# Patient Record
Sex: FEMALE | Race: WHITE | NOT HISPANIC OR LATINO | Employment: UNEMPLOYED | URBAN - METROPOLITAN AREA
[De-identification: names, ages, dates, MRNs, and addresses within clinical notes are randomized per-mention and may not be internally consistent; named-entity substitution may affect disease eponyms.]

---

## 2017-04-28 ENCOUNTER — ALLSCRIPTS OFFICE VISIT (OUTPATIENT)
Dept: OTHER | Facility: OTHER | Age: 47
End: 2017-04-28

## 2017-04-28 DIAGNOSIS — Z12.31 ENCOUNTER FOR SCREENING MAMMOGRAM FOR MALIGNANT NEOPLASM OF BREAST: ICD-10-CM

## 2018-01-10 NOTE — RESULT NOTES
Verified Results  ([address] [facility]) Pap IG, HPV-hr [id number] Denae Butt     Test Name Result Flag Reference   DIAGNOSIS: Comment     NEGATIVE FOR INTRAEPITHELIAL LESION AND MALIGNANCY  THE CYTOLOGY PROCESSING WAS PERFORMED AT THE [facility] LOCATED AT  [facility], [address] [place] [address], [address] [place] [id number]  Specimen adequacy: Comment     Satisfactory for evaluation  Endocervical and/or squamous metaplastic  cells (endocervical component) are present  Clinician provided ICD10: Comment     [id number]   Performed by: Torin Mcnair, Cytotechnologist (ASCP)     Erin Hanson Note: Comment     The Pap smear is a screening test designed to aid in the detection of  premalignant and malignant conditions of the uterine cervix  It is not a  diagnostic procedure and should not be used as the sole means of detecting  cervical cancer  Both false-positive and false-negative reports do occur  HPV, high-risk Negative  Negative   This high-risk HPV test detects thirteen high-risk types  (16/18/31/33/35/39/45/51/52/56/58/59/68) without differentiation

## 2018-01-14 VITALS
HEIGHT: 63 IN | DIASTOLIC BLOOD PRESSURE: 80 MMHG | SYSTOLIC BLOOD PRESSURE: 118 MMHG | HEART RATE: 68 BPM | RESPIRATION RATE: 16 BRPM | WEIGHT: 167.38 LBS | BODY MASS INDEX: 29.66 KG/M2

## 2018-08-10 ENCOUNTER — ANNUAL EXAM (OUTPATIENT)
Dept: OBGYN CLINIC | Facility: CLINIC | Age: 48
End: 2018-08-10
Payer: COMMERCIAL

## 2018-08-10 VITALS — WEIGHT: 165.8 LBS | SYSTOLIC BLOOD PRESSURE: 110 MMHG | BODY MASS INDEX: 29.84 KG/M2 | DIASTOLIC BLOOD PRESSURE: 74 MMHG

## 2018-08-10 DIAGNOSIS — Z12.39 SCREENING FOR MALIGNANT NEOPLASM OF BREAST: ICD-10-CM

## 2018-08-10 DIAGNOSIS — Z01.419 WELL WOMAN EXAM WITH ROUTINE GYNECOLOGICAL EXAM: Primary | ICD-10-CM

## 2018-08-10 PROCEDURE — 99396 PREV VISIT EST AGE 40-64: CPT | Performed by: OBSTETRICS & GYNECOLOGY

## 2018-08-10 NOTE — PROGRESS NOTES
ASSESSMENT & PLAN: Mil Doty is a 53 y o  [id number] with normal gynecologic exam     1   Routine well woman exam done today  2  Pap and HPV:  The patient's last pap was  and was normal   Pap and cotesting was not done today  Current ASCCP Guidelines reviewed  Repeat pap smear on 2019  3  Mammogram ordered  4  The following were reviewed in today's visit: breast self exam, mammography screening ordered, adequate intake of calcium and vitamin D, exercise and healthy diet  5  Contraception: IUD in place   6  Pelvic pain- pain is with intense work out  Has TVT-O placed by Dr Aubrie Cai office  Has not followed up with them  No apparent defects or visible pathology    Moving to [employer] for 3-5 years    CC:  Annual Gynecologic Examination    HPI: Mil Doty is a 53 y o  [id number] who presents for annual gynecologic examination  She has the following concerns:  Pelvic pain during workouts    Health Maintenance:    She exercises 4 days per week with walking  She wears her seatbelt routinely  She does perform irregular monthly self breast exams  She feels safe at home  Patients does follow a ketogenic diet  Her last pap was   Last mammogram: 2016    Past Medical History:   Diagnosis Date    Skin abnormalities        Past Surgical History:   Procedure Laterality Date    BLADDER SUSPENSION      LASIK         Past OB/Gyn History:  OB History      Para Term  AB Living    3 2 2   1 2    SAB TAB Ectopic Multiple Live Births    1       2           No LMP recorded (exact date)  Patient is not currently having periods (Reason: Birth Control)  Period Cycle (Days):  (HAS MIRENA)   History of sexually transmitted infection No  History of abnormal pap smears  No     Patient is currently sexually active  heterosexual Birth control: Mirena IUD since       Family History   Problem Relation Age of Onset    Colon cancer Mother     Heart disease Maternal Grandmother     Colon cancer Maternal Grandmother     Colon cancer Maternal Grandfather     Heart disease Paternal Grandfather     Hypertension Paternal Grandfather     Melanoma Paternal Grandfather        Social History:  Social History     Social History    Marital status: Single     Spouse name: N/A    Number of children: N/A    Years of education: N/A     Occupational History    Not on file  Social History Main Topics    Smoking status: Never Smoker    Smokeless tobacco: Never Used    Alcohol use Yes      Comment: socially    Drug use: No    Sexual activity: Yes     Birth control/ protection: IUD     Other Topics Concern    Not on file     Social History Narrative    No narrative on file     Presently lives with  and one son  Patient is   Patient is currently unemployed     No Known Allergies    Current Outpatient Prescriptions:     levonorgestrel (MIRENA, 46 MG,) 20 MCG/24HR IUD, by Intrauterine route, Disp: , Rfl:     Review of Systems:  A complete review of systems was performed and was negative, except as listed  none    Physical Exam:  /74   Wt 75 2 kg (165 lb 12 8 oz)   LMP  (Exact Date)   Breastfeeding? No   BMI 29 84 kg/m²    GEN: The patient was alert and oriented x3, pleasant well-appearing female in no acute distress  HEENT:  Unremarkable, no anterior or posterior lymphadenopathy, no thyromegaly  CV:  RRR, no murmurs  RESP:  Clear to auscultation bilaterally  BREAST:  Symmetric breasts with no palpable breast masses or obvious breast lesions  She has no retractions or nipple discharge  She has no axillary abnormalities or palpable masses  Self breast exam is taught  ABD:  Soft, nontender, nondistended, normoactive bowel sounds,  EXT:  WWP, nontender, no edema  BACK:  No CVA tenderness, no tenderness to palpation along spine  PELVIC:  Normal appearing external female genitalia, normal vaginal epithelium, No discharge  Cervix present    Bimanual: absent CMT,  normal uterus, non-tender  No palpable adnexal masses

## 2018-08-16 ENCOUNTER — HOSPITAL ENCOUNTER (OUTPATIENT)
Dept: MAMMOGRAPHY | Facility: HOSPITAL | Age: 48
Discharge: HOME | End: 2018-08-16
Attending: OBSTETRICS & GYNECOLOGY
Payer: COMMERCIAL

## 2018-08-16 DIAGNOSIS — Z12.39 SCREENING FOR MALIGNANT NEOPLASM OF BREAST: ICD-10-CM

## 2018-08-16 PROCEDURE — 77067 SCR MAMMO BI INCL CAD: CPT
